# Patient Record
Sex: MALE | Race: ASIAN | Employment: FULL TIME | URBAN - METROPOLITAN AREA
[De-identification: names, ages, dates, MRNs, and addresses within clinical notes are randomized per-mention and may not be internally consistent; named-entity substitution may affect disease eponyms.]

---

## 2024-01-12 ENCOUNTER — TELEPHONE (OUTPATIENT)
Dept: GASTROENTEROLOGY | Facility: CLINIC | Age: 29
End: 2024-01-12
Payer: COMMERCIAL

## 2024-01-12 NOTE — TELEPHONE ENCOUNTER
Pt requesting [facility] clinic appt for blood stains in underwear after bowel movements.  Clinic appt scheduled on Monday, January 29, 2024 at 2pm.  Clinic address given and repeated correctly.

## 2024-01-12 NOTE — TELEPHONE ENCOUNTER
----- Message from Marianne Wilder sent at 1/12/2024  9:47 AM CST -----  Type:   Appointment Request       Name of Caller:Pt   When is the first available appointment?N/A   Symptoms:bleeding in stool   Best Call Back Number:[phone]  Additional Information:

## 2024-11-05 ENCOUNTER — PATIENT MESSAGE (OUTPATIENT)
Dept: ADMINISTRATIVE | Facility: OTHER | Age: 29
End: 2024-11-05
Payer: COMMERCIAL

## 2024-11-25 ENCOUNTER — OFFICE VISIT (OUTPATIENT)
Dept: DERMATOLOGY | Facility: CLINIC | Age: 29
End: 2024-11-25
Payer: COMMERCIAL

## 2024-11-25 DIAGNOSIS — L21.9 SEBORRHEIC DERMATITIS: ICD-10-CM

## 2024-11-25 DIAGNOSIS — L63.9 ALOPECIA AREATA: Primary | ICD-10-CM

## 2024-11-25 PROCEDURE — 99204 OFFICE O/P NEW MOD 45 MIN: CPT | Mod: S$GLB,,, | Performed by: PHYSICIAN ASSISTANT

## 2024-11-25 PROCEDURE — 1159F MED LIST DOCD IN RCRD: CPT | Mod: CPTII,S$GLB,, | Performed by: PHYSICIAN ASSISTANT

## 2024-11-25 PROCEDURE — 99999 PR PBB SHADOW E&M-EST. PATIENT-LVL III: CPT | Mod: PBBFAC,,, | Performed by: PHYSICIAN ASSISTANT

## 2024-11-25 PROCEDURE — G2211 COMPLEX E/M VISIT ADD ON: HCPCS | Mod: S$GLB,,, | Performed by: PHYSICIAN ASSISTANT

## 2024-11-25 PROCEDURE — 1160F RVW MEDS BY RX/DR IN RCRD: CPT | Mod: CPTII,S$GLB,, | Performed by: PHYSICIAN ASSISTANT

## 2024-11-25 RX ORDER — BETAMETHASONE DIPROPIONATE 0.5 MG/G
CREAM TOPICAL
Qty: 50 G | Refills: 1 | Status: SHIPPED | OUTPATIENT
Start: 2024-11-25

## 2024-11-25 RX ORDER — KETOCONAZOLE 20 MG/ML
SHAMPOO, SUSPENSION TOPICAL
Qty: 120 ML | Refills: 5 | Status: SHIPPED | OUTPATIENT
Start: 2024-11-25

## 2024-11-25 NOTE — PROGRESS NOTES
Subjective:      Patient ID:  Jeff Lr is a 29 y.o. male who presents for   Chief Complaint   Patient presents with    Hair Loss     BEARD    Seborrheic Dermatitis     SCALP     Pt is present for hair loss & dryness. Denies any personal or family history of autoimmune diseases. Reports increased stress as he has been studying for the [employer] in the previous months.     Hair loss  Patient with new area of concern:   Location: beard  Duration: couple months  S/S: patches  Previous treatments: beard growth oil & derma roller    Dryness  Patient with new area of concern:   Location: scalp  Duration: a month  No s/s  Previous treatments: OTC treatments (Head and Shoulders)        Review of Systems   Constitutional:  Negative for fever and fatigue.   Gastrointestinal:  Negative for nausea and vomiting.   Skin:  Positive for itching. Negative for rash.        Dry scalp       Objective:   Physical Exam   Constitutional: He appears well-developed and well-nourished. No distress.   Neurological: He is alert and oriented to person, place, and time. He is not disoriented.   Psychiatric: He has a normal mood and affect.   Skin:   Areas Examined (abnormalities noted in diagram):   Scalp / Hair Palpated and Inspected  Head / Face Inspection Performed                              Diagram Legend     Erythematous scaling macule/papule c/w actinic keratosis       Vascular papule c/w angioma      Pigmented verrucoid papule/plaque c/w seborrheic keratosis      Yellow umbilicated papule c/w sebaceous hyperplasia      Irregularly shaped tan macule c/w lentigo     1-2 mm smooth white papules consistent with Milia      Movable subcutaneous cyst with punctum c/w epidermal inclusion cyst      Subcutaneous movable cyst c/w pilar cyst      Firm pink to brown papule c/w dermatofibroma      Pedunculated fleshy papule(s) c/w skin tag(s)      Evenly pigmented macule c/w junctional nevus     Mildly variegated pigmented, slightly  irregular-bordered macule c/w mildly atypical nevus      Flesh colored to evenly pigmented papule c/w intradermal nevus       Pink pearly papule/plaque c/w basal cell carcinoma      Erythematous hyperkeratotic cursted plaque c/w SCC      Surgical scar with no sign of skin cancer recurrence      Open and closed comedones      Inflammatory papules and pustules      Verrucoid papule consistent consistent with wart     Erythematous eczematous patches and plaques     Dystrophic onycholytic nail with subungual debris c/w onychomycosis     Umbilicated papule    Erythematous-base heme-crusted tan verrucoid plaque consistent with inflamed seborrheic keratosis     Erythematous Silvery Scaling Plaque c/w Psoriasis     See annotation      Assessment / Plan:        Alopecia areata  -     augmented betamethasone dipropionate (DIPROLENE-AF) 0.05 % cream; Apply to the affected areas on beard (and 1 cm beyond) once daily, Monday through Friday. Take a break from using on the weekends.  Dispense: 50 g; Refill: 1    -Discussed disease process and management  ment (typically with ILK, topical steroids, or both) Patient would like to try topical steroids first. May consider ILK in upcoming weeks.   -Patient given brochure on alopecia areata as well.     Pt educated that overuse of steroids can lead to skin thinning/atrophy, hypopigmentation, striae.      Seborrheic dermatitis  -     ketoconazole (NIZORAL) 2 % shampoo; Wash hair with medicated shampoo at least 2x/week - let sit on scalp at least 5 minutes prior to rinsing  Dispense: 120 mL; Refill: 5             Follow up in about 3 months (around 2/25/2025).

## 2024-11-25 NOTE — PATIENT INSTRUCTIONS
Alopecia areata  -     augmented betamethasone dipropionate (DIPROLENE-AF) 0.05 % cream; Apply to the affected areas on beard (and 1 cm beyond) once daily, Monday through Friday. Take a break from using on the weekends.  Dispense: 50 g; Refill: 1    -Discussed disease process and management  ment (typically with ILK, topical steroids, or both) Patient would like to try topical steroids first. May consider ILK in upcoming weeks.   -Patient given brochure on alopecia areata as well.     Pt educated that overuse of steroids can lead to skin thinning/atrophy, hypopigmentation, striae.      Seborrheic dermatitis  -     ketoconazole (NIZORAL) 2 % shampoo; Wash hair with medicated shampoo at least 2x/week - let sit on scalp at least 5 minutes prior to rinsing  Dispense: 120 mL; Refill: 5             Follow up in about 3 months (around 2/25/2025).